# Patient Record
Sex: MALE | Race: WHITE | NOT HISPANIC OR LATINO | URBAN - METROPOLITAN AREA
[De-identification: names, ages, dates, MRNs, and addresses within clinical notes are randomized per-mention and may not be internally consistent; named-entity substitution may affect disease eponyms.]

---

## 2023-05-11 ENCOUNTER — OFFICE VISIT (OUTPATIENT)
Dept: INTERNAL MEDICINE CLINIC | Facility: CLINIC | Age: 43
End: 2023-05-11

## 2023-05-11 VITALS
BODY MASS INDEX: 23.1 KG/M2 | OXYGEN SATURATION: 97 % | WEIGHT: 180 LBS | HEART RATE: 80 BPM | RESPIRATION RATE: 12 BRPM | DIASTOLIC BLOOD PRESSURE: 70 MMHG | HEIGHT: 74 IN | TEMPERATURE: 98.3 F | SYSTOLIC BLOOD PRESSURE: 122 MMHG

## 2023-05-11 DIAGNOSIS — R73.9 HYPERGLYCEMIA: ICD-10-CM

## 2023-05-11 DIAGNOSIS — R10.11 RIGHT UPPER QUADRANT ABDOMINAL PAIN: ICD-10-CM

## 2023-05-11 DIAGNOSIS — R53.83 OTHER FATIGUE: ICD-10-CM

## 2023-05-11 DIAGNOSIS — Z13.6 SCREENING FOR CARDIOVASCULAR CONDITION: ICD-10-CM

## 2023-05-11 DIAGNOSIS — R55 NEAR SYNCOPE: ICD-10-CM

## 2023-05-11 DIAGNOSIS — Z11.59 NEED FOR HEPATITIS C SCREENING TEST: ICD-10-CM

## 2023-05-11 DIAGNOSIS — R10.84 GENERALIZED ABDOMINAL PAIN: ICD-10-CM

## 2023-05-11 DIAGNOSIS — Z00.00 ANNUAL PHYSICAL EXAM: Primary | ICD-10-CM

## 2023-05-11 DIAGNOSIS — I45.10 INCOMPLETE RBBB: ICD-10-CM

## 2023-05-11 DIAGNOSIS — Z80.0 FAMILY HX OF COLON CANCER: ICD-10-CM

## 2023-05-11 DIAGNOSIS — E87.1 HYPONATREMIA: ICD-10-CM

## 2023-05-11 DIAGNOSIS — Z13.1 SCREENING FOR DIABETES MELLITUS: ICD-10-CM

## 2023-05-11 NOTE — PROGRESS NOTES
NEW PATIENT  S/P [facility] ER 23 NEAR SYNCOPE AND RUQ PAIN   Cbc cmp-ok  ekg icrbbb    No immaging no dx      6 years at go candida  Gi tract  Lower intestine  No gi dr Lutz  Colon ancer 78  Dizzy  Lightheaded dizzy  No meds no diagnosis   Term baby  Unemployed   [address]  No environmental  Travel-not outside [facility] INTER[facility] MEDICINE    NAME: Jennifer Dowd  AGE: 36 y o  SEX: male  : 1980     DATE: 2023     Assessment and Plan:     Problem List Items Addressed This Visit        Cardiovascular and Mediastinum    Incomplete RBBB    Relevant Orders    Holter monitor    Echo complete w/ contrast if indicated    Near syncope    Relevant Orders    Holter monitor    Echo complete w/ contrast if indicated       Other    Screening for cardiovascular condition - Primary    Relevant Orders    Comprehensive metabolic panel    Lipid panel    Need for hepatitis C screening test    Relevant Orders    Hepatitis C antibody    Other fatigue    Relevant Orders    CBC and differential    TSH, 3rd generation with Free T4 reflex    Right upper quadrant abdominal pain    Hyperglycemia    Hyponatremia    Family hx of colon cancer    Relevant Orders    Ambulatory Referral to Gastroenterology    Generalized abdominal pain    Relevant Orders    Lipase    US abdomen complete    Ambulatory Referral to Gastroenterology       Immunizations and preventive care screenings were discussed with patient today  Appropriate education was printed on patient's after visit summary  Discussed risks and benefits of prostate cancer screening  We discussed the controversial history of PSA screening for prostate cancer in the [place] as well as the risk of over detection and over treatment of prostate cancer by way of PSA screening    The patient understands that PSA blood testing is an imperfect way to screen for prostate cancer and that elevated PSA levels in the blood may also be caused by infection, inflammation, prostatic trauma or manipulation, urological procedures, or by benign prostatic enlargement  The role of the digital rectal examination in prostate cancer screening was also discussed and I discussed with him that there is large interobserver variability in the findings of digital rectal examination  Counseling:  · Exercise: the importance of regular exercise/physical activity was discussed  Recommend exercise 3-5 times per week for at least 30 minutes  Return in about 4 weeks (around 6/8/2023), or if symptoms worsen or fail to improve  Chief Complaint:     No chief complaint on file  History of Present Illness:     Adult Annual Physical   Patient here for a comprehensive physical exam  The patient reports no problems  Diet and Physical Activity  · Diet/Nutrition: well balanced diet  · Exercise: no formal exercise  Depression Screening  PHQ-2/9 Depression Screening         General Health  · Sleep: sleeps well  · Hearing: normal - none   · Vision: wears glasses  · Dental: regular dental visits   Health  · Symptoms include: none     Review of Systems:     Review of Systems   Constitutional: Negative for activity change, appetite change, chills, diaphoresis, fatigue and fever  HENT: Negative for congestion, facial swelling, hearing loss, mouth sores, rhinorrhea, sore throat, trouble swallowing and voice change  Eyes: Negative for photophobia and pain  Respiratory: Negative for apnea, cough, chest tightness, shortness of breath and stridor  Cardiovascular: Negative for chest pain, palpitations and leg swelling  Gastrointestinal: Negative for abdominal distention, abdominal pain, blood in stool and constipation  Endocrine: Negative for cold intolerance and heat intolerance  Genitourinary: Negative for difficulty urinating, dysuria, flank pain, genital sores, hematuria and urgency     Musculoskeletal: "Negative for arthralgias, back pain, gait problem, joint swelling and myalgias  Skin: Negative for rash and wound  Allergic/Immunologic: Negative for environmental allergies, food allergies and immunocompromised state  Neurological: Negative for dizziness, tremors, seizures, syncope, facial asymmetry, speech difficulty, weakness, light-headedness, numbness and headaches  Hematological: Negative for adenopathy  Does not bruise/bleed easily  Psychiatric/Behavioral: Negative for agitation, behavioral problems, hallucinations, self-injury, sleep disturbance and suicidal ideas  Past Medical History:     History reviewed  No pertinent past medical history  Past Surgical History:     History reviewed  No pertinent surgical history  Family History:     History reviewed  No pertinent family history  Social History:     Social History     Socioeconomic History   • Marital status: Unknown     Spouse name: None   • Number of children: None   • Years of education: None   • Highest education level: None   Occupational History   • None   Tobacco Use   • Smoking status: Never   • Smokeless tobacco: Never   Substance and Sexual Activity   • Alcohol use: None   • Drug use: None   • Sexual activity: None   Other Topics Concern   • None   Social History Narrative   • None     Social Determinants of Health     Financial Resource Strain: Not on file   Food Insecurity: Not on file   Transportation Needs: Not on file   Physical Activity: Not on file   Stress: Not on file   Social Connections: Not on file   Intimate Partner Violence: Not on file   Housing Stability: Not on file      Current Medications:     No current outpatient medications on file  No current facility-administered medications for this visit        Allergies:     No Known Allergies   Physical Exam:     /70   Pulse 80   Temp 98 3 °F (36 8 °C)   Resp 12   Ht 6' 2\" (1 88 m)   Wt 81 6 kg (180 lb)   SpO2 97%   BMI 23 11 kg/m²     Physical " Exam  Constitutional:       General: He is not in acute distress  Appearance: Normal appearance  He is not ill-appearing or toxic-appearing  HENT:      Head: Normocephalic and atraumatic  Right Ear: Tympanic membrane and external ear normal       Left Ear: Tympanic membrane and external ear normal       Nose: Nose normal       Mouth/Throat:      Mouth: Mucous membranes are moist       Pharynx: Oropharynx is clear  Eyes:      General: No scleral icterus  Right eye: No discharge  Left eye: No discharge  Extraocular Movements: Extraocular movements intact  Conjunctiva/sclera: Conjunctivae normal       Pupils: Pupils are equal, round, and reactive to light  Neck:      Vascular: No carotid bruit  Cardiovascular:      Rate and Rhythm: Normal rate and regular rhythm  Pulses: Normal pulses  Heart sounds: Murmur heard  No friction rub  No gallop  Pulmonary:      Effort: Pulmonary effort is normal       Breath sounds: Normal breath sounds  No wheezing, rhonchi or rales  Abdominal:      General: Bowel sounds are normal  There is no distension  Palpations: Abdomen is soft  There is no mass  Tenderness: There is no guarding or rebound  Musculoskeletal:         General: No swelling  Cervical back: Normal range of motion and neck supple  No rigidity  Right lower leg: No edema  Left lower leg: No edema  Lymphadenopathy:      Cervical: No cervical adenopathy  Skin:     General: Skin is warm  Capillary Refill: Capillary refill takes less than 2 seconds  Coloration: Skin is not jaundiced  Findings: No rash  Neurological:      General: No focal deficit present  Mental Status: He is alert and oriented to person, place, and time  Cranial Nerves: No cranial nerve deficit  Sensory: No sensory deficit  Motor: No weakness        Gait: Gait normal       Deep Tendon Reflexes: Reflexes normal    Psychiatric: Mood and Affect: Mood normal          Behavior: Behavior normal          Judgment: Judgment normal           Yessica Guzman DO  [facility] INTERNAL MEDICINE

## 2023-05-23 ENCOUNTER — HOSPITAL ENCOUNTER (OUTPATIENT)
Dept: NON INVASIVE DIAGNOSTICS | Facility: HOSPITAL | Age: 43
Discharge: HOME | End: 2023-05-23
Attending: INTERNAL MEDICINE

## 2023-05-23 ENCOUNTER — HOSPITAL ENCOUNTER (OUTPATIENT)
Dept: ULTRASOUND IMAGING | Facility: HOSPITAL | Age: 43
Discharge: HOME | End: 2023-05-23
Attending: INTERNAL MEDICINE

## 2023-05-23 VITALS
HEIGHT: 74 IN | DIASTOLIC BLOOD PRESSURE: 70 MMHG | BODY MASS INDEX: 23.1 KG/M2 | WEIGHT: 180 LBS | HEART RATE: 70 BPM | SYSTOLIC BLOOD PRESSURE: 122 MMHG

## 2023-05-23 DIAGNOSIS — I45.10 INCOMPLETE RBBB: ICD-10-CM

## 2023-05-23 DIAGNOSIS — R10.84 GENERALIZED ABDOMINAL PAIN: ICD-10-CM

## 2023-05-23 DIAGNOSIS — R55 NEAR SYNCOPE: ICD-10-CM

## 2023-05-24 LAB
AORTIC ROOT: 3.6 CM
AORTIC VALVE MEAN VELOCITY: 9.7 M/S
APICAL FOUR CHAMBER EJECTION FRACTION: 65 %
AV LVOT MEAN GRADIENT: 3 MMHG
AV LVOT PEAK GRADIENT: 6 MMHG
AV MEAN GRADIENT: 4 MMHG
AV PEAK GRADIENT: 9 MMHG
AV VELOCITY RATIO: 0.8
DOP CALC AO PEAK VEL: 1.47 M/S
DOP CALC AO VTI: 29.39 CM
DOP CALC LVOT PEAK VEL VTI: 25.29 CM
DOP CALC LVOT PEAK VEL: 1.18 M/S
DOP CALC MV VTI: 27.85 CM
E WAVE DECELERATION TIME: 230 MS
FRACTIONAL SHORTENING: 30 % (ref 28–44)
GLOBAL LONGITUIDAL STRAIN: -22 %
INTERVENTRICULAR SEPTUM IN DIASTOLE (PARASTERNAL SHORT AXIS VIEW): 0.6 CM
INTERVENTRICULAR SEPTUM: 0.6 CM (ref 0.6–1.1)
IVC: 25 MM
LAAS-AP2: 16.4 CM2
LAAS-AP4: 15.3 CM2
LEFT ATRIUM SIZE: 3.3 CM
LEFT INTERNAL DIMENSION IN SYSTOLE: 3.5 CM (ref 2.1–4)
LEFT VENTRICLE DIASTOLIC VOLUME (MOD BIPLANE): 133 ML
LEFT VENTRICLE SYSTOLIC VOLUME (MOD BIPLANE): 45 ML
LEFT VENTRICULAR INTERNAL DIMENSION IN DIASTOLE: 5 CM (ref 3.5–6)
LEFT VENTRICULAR POSTERIOR WALL IN END DIASTOLE: 0.6 CM
LEFT VENTRICULAR STROKE VOLUME: 69 ML
LV EF: 66 %
LVSV (TEICH): 69 ML
MV E'TISSUE VEL-LAT: 20 CM/S
MV E'TISSUE VEL-SEP: 13 CM/S
MV MEAN GRADIENT: 1 MMHG
MV PEAK A VEL: 0.48 M/S
MV PEAK E VEL: 84 CM/S
MV PEAK GRADIENT: 4 MMHG
MV STENOSIS PRESSURE HALF TIME: 67 MS
MV VALVE AREA P 1/2 METHOD: 3.28 CM2
RA PRESSURE ESTIMATED: 8 MMHG
RIGHT ATRIAL 2D VOLUME: 32 ML
RIGHT ATRIUM AREA SYSTOLE A4C: 13.6 CM2
RIGHT VENTRICLE ID DIMENSION: 3.7 CM
SL CV LEFT ATRIUM LENGTH A2C: 4.7 CM
SL CV LV EF: 66
SL CV PED ECHO LEFT VENTRICLE DIASTOLIC VOLUME (MOD BIPLANE) 2D: 120 ML
SL CV PED ECHO LEFT VENTRICLE SYSTOLIC VOLUME (MOD BIPLANE) 2D: 51 ML
TRICUSPID ANNULAR PLANE SYSTOLIC EXCURSION: 2.7 CM

## 2023-06-01 ENCOUNTER — HOSPITAL ENCOUNTER (OUTPATIENT)
Dept: ULTRASOUND IMAGING | Facility: HOSPITAL | Age: 43
End: 2023-06-01
Attending: INTERNAL MEDICINE
Payer: COMMERCIAL

## 2023-06-01 PROCEDURE — 76700 US EXAM ABDOM COMPLETE: CPT

## 2023-06-05 ENCOUNTER — HOSPITAL ENCOUNTER (OUTPATIENT)
Dept: NON INVASIVE DIAGNOSTICS | Facility: HOSPITAL | Age: 43
Discharge: HOME | End: 2023-06-05
Attending: INTERNAL MEDICINE
Payer: COMMERCIAL

## 2023-06-05 ENCOUNTER — APPOINTMENT (OUTPATIENT)
Dept: LAB | Facility: HOSPITAL | Age: 43
End: 2023-06-05
Payer: COMMERCIAL

## 2023-06-05 DIAGNOSIS — R10.84 GENERALIZED ABDOMINAL PAIN: ICD-10-CM

## 2023-06-05 DIAGNOSIS — R53.83 OTHER FATIGUE: ICD-10-CM

## 2023-06-05 DIAGNOSIS — R55 NEAR SYNCOPE: ICD-10-CM

## 2023-06-05 DIAGNOSIS — Z11.59 NEED FOR HEPATITIS C SCREENING TEST: ICD-10-CM

## 2023-06-05 DIAGNOSIS — I45.10 INCOMPLETE RBBB: ICD-10-CM

## 2023-06-05 DIAGNOSIS — Z13.6 SCREENING FOR CARDIOVASCULAR CONDITION: ICD-10-CM

## 2023-06-05 LAB
ALBUMIN SERPL BCP-MCNC: 4.6 G/DL (ref 3.5–5)
ALP SERPL-CCNC: 27 U/L (ref 34–104)
ALT SERPL W P-5'-P-CCNC: 14 U/L (ref 7–52)
ANION GAP SERPL CALCULATED.3IONS-SCNC: 5 MMOL/L (ref 4–13)
AST SERPL W P-5'-P-CCNC: 12 U/L (ref 13–39)
BASOPHILS # BLD AUTO: 0.05 THOUSANDS/ÂΜL (ref 0–0.1)
BASOPHILS NFR BLD AUTO: 1 % (ref 0–1)
BILIRUB SERPL-MCNC: 1.31 MG/DL (ref 0.2–1)
BUN SERPL-MCNC: 13 MG/DL (ref 5–25)
CALCIUM SERPL-MCNC: 9.9 MG/DL (ref 8.4–10.2)
CHLORIDE SERPL-SCNC: 102 MMOL/L (ref 96–108)
CHOLEST SERPL-MCNC: 262 MG/DL
CO2 SERPL-SCNC: 31 MMOL/L (ref 21–32)
CREAT SERPL-MCNC: 0.87 MG/DL (ref 0.6–1.3)
EOSINOPHIL # BLD AUTO: 0.1 THOUSAND/ÂΜL (ref 0–0.61)
EOSINOPHIL NFR BLD AUTO: 2 % (ref 0–6)
ERYTHROCYTE [DISTWIDTH] IN BLOOD BY AUTOMATED COUNT: 12 % (ref 11.6–15.1)
GFR SERPL CREATININE-BSD FRML MDRD: 105 ML/MIN/1.73SQ M
GLUCOSE P FAST SERPL-MCNC: 100 MG/DL (ref 65–99)
HCT VFR BLD AUTO: 37.8 % (ref 36.5–49.3)
HCV AB SER QL: NORMAL
HDLC SERPL-MCNC: 70 MG/DL
HGB BLD-MCNC: 12.6 G/DL (ref 12–17)
IMM GRANULOCYTES # BLD AUTO: 0.01 THOUSAND/UL (ref 0–0.2)
IMM GRANULOCYTES NFR BLD AUTO: 0 % (ref 0–2)
LDLC SERPL CALC-MCNC: 180 MG/DL (ref 0–100)
LIPASE SERPL-CCNC: 16 U/L (ref 11–82)
LYMPHOCYTES # BLD AUTO: 1.83 THOUSANDS/ÂΜL (ref 0.6–4.47)
LYMPHOCYTES NFR BLD AUTO: 40 % (ref 14–44)
MCH RBC QN AUTO: 29.4 PG (ref 26.8–34.3)
MCHC RBC AUTO-ENTMCNC: 33.3 G/DL (ref 31.4–37.4)
MCV RBC AUTO: 88 FL (ref 82–98)
MONOCYTES # BLD AUTO: 0.48 THOUSAND/ÂΜL (ref 0.17–1.22)
MONOCYTES NFR BLD AUTO: 11 % (ref 4–12)
NEUTROPHILS # BLD AUTO: 2.08 THOUSANDS/ÂΜL (ref 1.85–7.62)
NEUTS SEG NFR BLD AUTO: 46 % (ref 43–75)
NONHDLC SERPL-MCNC: 192 MG/DL
NRBC BLD AUTO-RTO: 0 /100 WBCS
PLATELET # BLD AUTO: 150 THOUSANDS/UL (ref 149–390)
PMV BLD AUTO: 11.1 FL (ref 8.9–12.7)
POTASSIUM SERPL-SCNC: 4.1 MMOL/L (ref 3.5–5.3)
PROT SERPL-MCNC: 7.7 G/DL (ref 6.4–8.4)
RBC # BLD AUTO: 4.29 MILLION/UL (ref 3.88–5.62)
SODIUM SERPL-SCNC: 138 MMOL/L (ref 135–147)
TRIGL SERPL-MCNC: 59 MG/DL
TSH SERPL DL<=0.05 MIU/L-ACNC: 1.42 UIU/ML (ref 0.45–4.5)
WBC # BLD AUTO: 4.55 THOUSAND/UL (ref 4.31–10.16)

## 2023-06-05 PROCEDURE — 83690 ASSAY OF LIPASE: CPT

## 2023-06-05 PROCEDURE — 80061 LIPID PANEL: CPT

## 2023-06-05 PROCEDURE — 93225 XTRNL ECG REC<48 HRS REC: CPT

## 2023-06-05 PROCEDURE — 36415 COLL VENOUS BLD VENIPUNCTURE: CPT

## 2023-06-05 PROCEDURE — 84443 ASSAY THYROID STIM HORMONE: CPT

## 2023-06-05 PROCEDURE — 85025 COMPLETE CBC W/AUTO DIFF WBC: CPT

## 2023-06-05 PROCEDURE — 86803 HEPATITIS C AB TEST: CPT

## 2023-06-05 PROCEDURE — 93226 XTRNL ECG REC<48 HR SCAN A/R: CPT

## 2023-06-05 PROCEDURE — 80053 COMPREHEN METABOLIC PANEL: CPT

## 2023-06-06 ENCOUNTER — APPOINTMENT (EMERGENCY)
Dept: RADIOLOGY | Facility: HOSPITAL | Age: 43
End: 2023-06-06
Payer: COMMERCIAL

## 2023-06-06 ENCOUNTER — HOSPITAL ENCOUNTER (EMERGENCY)
Facility: HOSPITAL | Age: 43
Discharge: HOME | End: 2023-06-06
Attending: EMERGENCY MEDICINE
Payer: COMMERCIAL

## 2023-06-06 VITALS
RESPIRATION RATE: 16 BRPM | DIASTOLIC BLOOD PRESSURE: 74 MMHG | HEART RATE: 65 BPM | TEMPERATURE: 99.4 F | WEIGHT: 180 LBS | SYSTOLIC BLOOD PRESSURE: 130 MMHG | OXYGEN SATURATION: 100 % | HEIGHT: 74 IN | BODY MASS INDEX: 23.1 KG/M2

## 2023-06-06 DIAGNOSIS — R07.89 ATYPICAL CHEST PAIN: Primary | ICD-10-CM

## 2023-06-06 LAB
ALBUMIN SERPL BCP-MCNC: 4.3 G/DL (ref 3.5–5)
ALP SERPL-CCNC: 26 U/L (ref 34–104)
ALT SERPL W P-5'-P-CCNC: 13 U/L (ref 7–52)
ANION GAP SERPL CALCULATED.3IONS-SCNC: 6 MMOL/L (ref 4–13)
APTT PPP: 27 SECONDS (ref 23–37)
AST SERPL W P-5'-P-CCNC: 11 U/L (ref 13–39)
BASOPHILS # BLD AUTO: 0.05 THOUSANDS/ÂΜL (ref 0–0.1)
BASOPHILS NFR BLD AUTO: 1 % (ref 0–1)
BILIRUB SERPL-MCNC: 0.93 MG/DL (ref 0.2–1)
BNP SERPL-MCNC: 33 PG/ML (ref 0–100)
BUN SERPL-MCNC: 13 MG/DL (ref 5–25)
CALCIUM SERPL-MCNC: 9.3 MG/DL (ref 8.4–10.2)
CARDIAC TROPONIN I PNL SERPL HS: <2 NG/L
CHLORIDE SERPL-SCNC: 104 MMOL/L (ref 96–108)
CO2 SERPL-SCNC: 28 MMOL/L (ref 21–32)
CREAT SERPL-MCNC: 0.86 MG/DL (ref 0.6–1.3)
EOSINOPHIL # BLD AUTO: 0.07 THOUSAND/ÂΜL (ref 0–0.61)
EOSINOPHIL NFR BLD AUTO: 1 % (ref 0–6)
ERYTHROCYTE [DISTWIDTH] IN BLOOD BY AUTOMATED COUNT: 11.9 % (ref 11.6–15.1)
GFR SERPL CREATININE-BSD FRML MDRD: 106 ML/MIN/1.73SQ M
GLUCOSE SERPL-MCNC: 102 MG/DL (ref 65–140)
HCT VFR BLD AUTO: 36.1 % (ref 36.5–49.3)
HGB BLD-MCNC: 12.1 G/DL (ref 12–17)
IMM GRANULOCYTES # BLD AUTO: 0.01 THOUSAND/UL (ref 0–0.2)
IMM GRANULOCYTES NFR BLD AUTO: 0 % (ref 0–2)
INR PPP: 1.02 (ref 0.84–1.19)
LYMPHOCYTES # BLD AUTO: 1.39 THOUSANDS/ÂΜL (ref 0.6–4.47)
LYMPHOCYTES NFR BLD AUTO: 24 % (ref 14–44)
MCH RBC QN AUTO: 29.6 PG (ref 26.8–34.3)
MCHC RBC AUTO-ENTMCNC: 33.5 G/DL (ref 31.4–37.4)
MCV RBC AUTO: 88 FL (ref 82–98)
MONOCYTES # BLD AUTO: 0.53 THOUSAND/ÂΜL (ref 0.17–1.22)
MONOCYTES NFR BLD AUTO: 9 % (ref 4–12)
NEUTROPHILS # BLD AUTO: 3.87 THOUSANDS/ÂΜL (ref 1.85–7.62)
NEUTS SEG NFR BLD AUTO: 65 % (ref 43–75)
NRBC BLD AUTO-RTO: 0 /100 WBCS
PLATELET # BLD AUTO: 145 THOUSANDS/UL (ref 149–390)
PMV BLD AUTO: 11.1 FL (ref 8.9–12.7)
POTASSIUM SERPL-SCNC: 4 MMOL/L (ref 3.5–5.3)
PROT SERPL-MCNC: 7.2 G/DL (ref 6.4–8.4)
PROTHROMBIN TIME: 14.1 SECONDS (ref 11.6–14.5)
RBC # BLD AUTO: 4.09 MILLION/UL (ref 3.88–5.62)
SODIUM SERPL-SCNC: 138 MMOL/L (ref 135–147)
TSH SERPL DL<=0.05 MIU/L-ACNC: 1.08 UIU/ML (ref 0.45–4.5)
WBC # BLD AUTO: 5.92 THOUSAND/UL (ref 4.31–10.16)

## 2023-06-06 PROCEDURE — 85025 COMPLETE CBC W/AUTO DIFF WBC: CPT | Performed by: EMERGENCY MEDICINE

## 2023-06-06 PROCEDURE — 36415 COLL VENOUS BLD VENIPUNCTURE: CPT | Performed by: EMERGENCY MEDICINE

## 2023-06-06 PROCEDURE — 84484 ASSAY OF TROPONIN QUANT: CPT | Performed by: EMERGENCY MEDICINE

## 2023-06-06 PROCEDURE — 85730 THROMBOPLASTIN TIME PARTIAL: CPT | Performed by: EMERGENCY MEDICINE

## 2023-06-06 PROCEDURE — 93005 ELECTROCARDIOGRAM TRACING: CPT

## 2023-06-06 PROCEDURE — 80053 COMPREHEN METABOLIC PANEL: CPT | Performed by: EMERGENCY MEDICINE

## 2023-06-06 PROCEDURE — 85610 PROTHROMBIN TIME: CPT | Performed by: EMERGENCY MEDICINE

## 2023-06-06 PROCEDURE — 83880 ASSAY OF NATRIURETIC PEPTIDE: CPT | Performed by: EMERGENCY MEDICINE

## 2023-06-06 PROCEDURE — 71045 X-RAY EXAM CHEST 1 VIEW: CPT

## 2023-06-06 PROCEDURE — 84443 ASSAY THYROID STIM HORMONE: CPT | Performed by: EMERGENCY MEDICINE

## 2023-06-06 RX ORDER — ACETAMINOPHEN 325 MG/1
650 TABLET ORAL ONCE
Status: COMPLETED | OUTPATIENT
Start: 2023-06-06 | End: 2023-06-06

## 2023-06-06 RX ORDER — KETOROLAC TROMETHAMINE 30 MG/ML
15 INJECTION, SOLUTION INTRAMUSCULAR; INTRAVENOUS ONCE
Status: DISCONTINUED | OUTPATIENT
Start: 2023-06-06 | End: 2023-06-06 | Stop reason: HOSPADM

## 2023-06-06 RX ADMIN — ACETAMINOPHEN 650 MG: 325 TABLET, FILM COATED ORAL at 18:03

## 2023-06-06 NOTE — ED PROVIDER NOTES
History  Chief Complaint   Patient presents with   • Chest Pain     Patient presents to ED with chest pain/lightheaded sensation that began one hour ago  This is a 44-year-old who presents with forehead tingling and lightheadedness in addition to a vague substernal chest discomfort that started 1 hour ago while at rest   He has a non-smoker does have a history of hypercholesterolemia  No prior history of DVT or PE  Recently seen by primary care physician who noticed a heart murmur and ordered a cardiac monitor which is currently in place      History provided by:  Patient  Medical Problem  Location:  Substernal  Quality:  Vague discomfort  Severity:  Mild  Onset quality:  Sudden  Duration:  1 hour  Timing:  Constant  Progression:  Waxing and waning  Chronicity:  New  Context:  Chest discomfort while at rest  Associated symptoms: chest pain        None       History reviewed  No pertinent past medical history  History reviewed  No pertinent surgical history  History reviewed  No pertinent family history  I have reviewed and agree with the history as documented  E-Cigarette/Vaping     E-Cigarette/Vaping Substances     Social History     Tobacco Use   • Smoking status: Never   • Smokeless tobacco: Never       Review of Systems   Cardiovascular: Positive for chest pain  Neurological: Positive for light-headedness  All other systems reviewed and are negative  Physical Exam  Physical Exam  Vitals and nursing note reviewed  Constitutional:       General: He is not in acute distress  Appearance: He is not ill-appearing, toxic-appearing or diaphoretic  HENT:      Head: Normocephalic and atraumatic  Right Ear: External ear normal       Left Ear: External ear normal    Eyes:      General: No scleral icterus  Right eye: No discharge  Left eye: No discharge  Extraocular Movements: Extraocular movements intact  Pupils: Pupils are equal, round, and reactive to light  Cardiovascular:      Rate and Rhythm: Normal rate and regular rhythm  Pulses: Normal pulses  Heart sounds: Murmur heard  No friction rub  No gallop  Pulmonary:      Effort: Pulmonary effort is normal  No respiratory distress  Breath sounds: No stridor  No wheezing, rhonchi or rales  Abdominal:      General: There is no distension  Palpations: Abdomen is soft  Tenderness: There is no abdominal tenderness  There is no guarding or rebound  Musculoskeletal:         General: No swelling, tenderness, deformity or signs of injury  Normal range of motion  Cervical back: Normal range of motion and neck supple  No rigidity or tenderness  Right lower leg: No edema  Left lower leg: No edema  Skin:     General: Skin is warm  Coloration: Skin is not jaundiced or pale  Findings: No bruising, erythema or rash  Neurological:      General: No focal deficit present  Mental Status: He is alert and oriented to person, place, and time  Cranial Nerves: No cranial nerve deficit  Sensory: No sensory deficit  Motor: No weakness  Coordination: Coordination normal       Gait: Gait normal    Psychiatric:         Mood and Affect: Mood normal          Behavior: Behavior normal          Thought Content:  Thought content normal          Vital Signs  ED Triage Vitals   Temperature Pulse Respirations Blood Pressure SpO2   06/06/23 1703 06/06/23 1703 06/06/23 1703 06/06/23 1703 06/06/23 1703   99 4 °F (37 4 °C) 73 18 127/80 98 %      Temp src Heart Rate Source Patient Position - Orthostatic VS BP Location FiO2 (%)   -- 06/06/23 1703 06/06/23 1703 06/06/23 1703 --    Monitor Sitting Right arm       Pain Score       06/06/23 1803       2           Vitals:    06/06/23 1703 06/06/23 1805   BP: 127/80 130/74   Pulse: 73 65   Patient Position - Orthostatic VS: Sitting Sitting         Visual Acuity      ED Medications  Medications   ketorolac (TORADOL) injection 15 mg (15 mg Intravenous Not Given 6/6/23 1803)   acetaminophen (TYLENOL) tablet 650 mg (650 mg Oral Given 6/6/23 1803)       Diagnostic Studies  Results Reviewed     Procedure Component Value Units Date/Time    TSH [[id number]]  (Normal) Collected: 06/06/23 1735    Lab Status: Final result Specimen: Blood from Arm, Left Updated: 06/06/23 1815     TSH 3RD GENERATON 1 082 uIU/mL     HS Troponin 0hr (reflex protocol) [[id number]]  (Normal) Collected: 06/06/23 1735    Lab Status: Final result Specimen: Blood from Arm, Left Updated: 06/06/23 1806     hs TnI 0hr <2 ng/L     HS Troponin I 2hr [[id number]]     Lab Status: No result Specimen: Blood     B-Type Natriuretic Peptide(BNP) [[id number]]  (Normal) Collected: 06/06/23 1735    Lab Status: Final result Specimen: Blood from Arm, Left Updated: 06/06/23 1805     BNP 33 pg/mL     CBC and differential [[id number]]  (Abnormal) Collected: 06/06/23 1735    Lab Status: Final result Specimen: Blood from Arm, Left Updated: 06/06/23 1802     WBC 5 92 Thousand/uL      RBC 4 09 Million/uL      Hemoglobin 12 1 g/dL      Hematocrit 36 1 %      MCV 88 fL      MCH 29 6 pg      MCHC 33 5 g/dL      RDW 11 9 %      MPV 11 1 fL      Platelets 035 Thousands/uL      nRBC 0 /100 WBCs      Neutrophils Relative 65 %      Immat GRANS % 0 %      Lymphocytes Relative 24 %      Monocytes Relative 9 %      Eosinophils Relative 1 %      Basophils Relative 1 %      Neutrophils Absolute 3 87 Thousands/µL      Immature Grans Absolute 0 01 Thousand/uL      Lymphocytes Absolute 1 39 Thousands/µL      Monocytes Absolute 0 53 Thousand/µL      Eosinophils Absolute 0 07 Thousand/µL      Basophils Absolute 0 05 Thousands/µL     Comprehensive metabolic panel [[id number]]  (Abnormal) Collected: 06/06/23 1735    Lab Status: Final result Specimen: Blood from Arm, Left Updated: 06/06/23 1758     Sodium 138 mmol/L      Potassium 4 0 mmol/L      Chloride 104 mmol/L      CO2 28 mmol/L      ANION GAP 6 mmol/L      BUN 13 mg/dL Creatinine 0 86 mg/dL      Glucose 102 mg/dL      Calcium 9 3 mg/dL      AST 11 U/L      ALT 13 U/L      Alkaline Phosphatase 26 U/L      Total Protein 7 2 g/dL      Albumin 4 3 g/dL      Total Bilirubin 0 93 mg/dL      eGFR 106 ml/min/1 73sq m     Narrative:      Jac guidelines for Chronic Kidney Disease (CKD):   •  Stage 1 with normal or high GFR (GFR > 90 mL/min/1 73 square meters)  •  Stage 2 Mild CKD (GFR = 60-89 mL/min/1 73 square meters)  •  Stage 3A Moderate CKD (GFR = 45-59 mL/min/1 73 square meters)  •  Stage 3B Moderate CKD (GFR = 30-44 mL/min/1 73 square meters)  •  Stage 4 Severe CKD (GFR = 15-29 mL/min/1 73 square meters)  •  Stage 5 End Stage CKD (GFR <15 mL/min/1 73 square meters)  Note: GFR calculation is accurate only with a steady state creatinine    Protime-INR [[id number]]  (Normal) Collected: 06/06/23 1735    Lab Status: Final result Specimen: Blood from Arm, Left Updated: 06/06/23 1755     Protime 14 1 seconds      INR 1 02    APTT [[id number]]  (Normal) Collected: 06/06/23 1735    Lab Status: Final result Specimen: Blood from Arm, Left Updated: 06/06/23 1755     PTT 27 seconds                  XR chest 1 view portable   ED Interpretation by Abi Delgado DO (06/06 1915)   No acute infiltrate or pneumothorax                 Procedures  ECG 12 Lead Documentation Only    Date/Time: 6/6/2023 5:26 PM    Performed by: Abi Delgado DO  Authorized by: Abi Delgado DO    ECG reviewed by me, the ED Provider: yes    Patient location:  ED  Rate:     ECG rate:  71  Rhythm:     Rhythm: sinus rhythm    Conduction:     Conduction: abnormal      Abnormal conduction: non-specific intraventricular conduction delay    T waves:     T waves: normal               ED Course  ED Course as of 06/06/23 1923 Tue Jun 06, 2023 1754 Repeat EKG is sinus without any acute injury similar to previous EKG patient started to have some more chest discomfort             HEART Risk Score    Flowsheet Row Most Recent Value   Heart Score Risk Calculator    History 0 Filed at: 06/06/2023 1915   ECG 0 Filed at: 06/06/2023 1915   Age 0 Filed at: 06/06/2023 1915   Risk Factors 1 Filed at: 06/06/2023 1915   Troponin 0 Filed at: 06/06/2023 1915   HEART Score 1 Filed at: 06/06/2023 1915           Stroke Assessment     Row Name 06/06/23 1737             NIH Stroke Scale    Interval Baseline      Level of Consciousness (1a ) 0      LOC Questions (1b ) 0      LOC Commands (1c ) 0      Best Gaze (2 ) 0      Visual (3 ) 0      Facial Palsy (4 ) 0      Motor Arm, Left (5a ) 0      Motor Arm, Right (5b ) 0      Motor Leg, Left (6a ) 0      Motor Leg, Right (6b ) 0      Limb Ataxia (7 ) 0      Sensory (8 ) 0      Best Language (9 ) 0      Dysarthria (10 ) 0      Extinction and Inattention (11 ) (Formerly Neglect) 0      Total 0                    PERC Rule for PE    Flowsheet Row Most Recent Value   PERC Rule for PE    Age >=50 0 Filed at: 06/06/2023 1801   HR >=100 0 Filed at: 06/06/2023 1801   O2 Sat on room air < 95% 0 Filed at: 06/06/2023 1801   History of PE or DVT 0 Filed at: 06/06/2023 1801   Recent trauma or surgery 0 Filed at: 06/06/2023 1801   Hemoptysis 0 Filed at: 06/06/2023 1801   Exogenous estrogen 0 Filed at: 06/06/2023 1801   Unilateral leg swelling 0 Filed at: 06/06/2023 1801   PERC Rule for PE Results 0 Filed at: 06/06/2023 1801              SBIRT 22yo+    Flowsheet Row Most Recent Value   Initial Alcohol Screen: US AUDIT-C     1  How often do you have a drink containing alcohol? 0 Filed at: 06/06/2023 1704   2  How many drinks containing alcohol do you have on a typical day you are drinking? 0 Filed at: 06/06/2023 1704   3a  Male UNDER 65: How often do you have five or more drinks on one occasion? 0 Filed at: 06/06/2023 1704   Audit-C Score 0 Filed at: 06/06/2023 1611   CINDY: How many times in the past year have you        Used an illegal drug or used a prescription medication for non-medical reasons? Never Filed at: 06/06/2023 1704          Wells' Criteria for PE    Flowsheet Row Most Recent Value   Wells' Criteria for PE    Clinical signs and symptoms of DVT 0 Filed at: 06/06/2023 1800   PE is primary diagnosis or equally likely 0 Filed at: 06/06/2023 1800   HR >100 0 Filed at: 06/06/2023 1800   Immobilization at least 3 days or Surgery in the previous 4 weeks 0 Filed at: 06/06/2023 1800   Previous, objectively diagnosed PE or DVT 0 Filed at: 06/06/2023 1800   Hemoptysis 0 Filed at: 06/06/2023 1800   Malignancy with treatment within 6 months or palliative 0 Filed at: 06/06/2023 1800   Wells' Criteria Total 0 Filed at: 06/06/2023 1800                Medical Decision Making  Atypical chest pain rule out acute coronary syndrome versus esophagitis pneumothorax musculoskeletal pain work-up in progress including EKG chest x-ray and lab work    Amount and/or Complexity of Data Reviewed  Labs: ordered  Radiology: ordered  Disposition  Final diagnoses:   Atypical chest pain     Time reflects when diagnosis was documented in both MDM as applicable and the Disposition within this note     Time User Action Codes Description Comment    6/6/2023  5:38 PM Albina Prader Add [R07 89] Atypical chest pain       ED Disposition     ED Disposition   Discharge    Condition   Stable    Date/Time   Tue Jun 6, 2023  7:22 PM    Comment   Yuval Lu discharge to home/self care  Follow-up Information     Follow up With Specialties Details Why [address], DO Internal Medicine In 1 week  [address] [facility] [address] [facility]            Patient's Medications    No medications on file       No discharge procedures on file      PDMP Review       Value Time User    PDMP Reviewed  Yes 5/11/2023  8:16 AM Lupillo Leroy DO          ED Provider  Electronically Signed by           Abi Delgado DO  06/06/23 1926

## 2023-06-09 LAB
ATRIAL RATE: 72 BPM
P AXIS: 72 DEGREES
PR INTERVAL: 194 MS
QRS AXIS: 45 DEGREES
QRSD INTERVAL: 118 MS
QT INTERVAL: 400 MS
QTC INTERVAL: 438 MS
T WAVE AXIS: 65 DEGREES
VENTRICULAR RATE: 72 BPM

## 2023-06-09 PROCEDURE — 93010 ELECTROCARDIOGRAM REPORT: CPT | Performed by: INTERNAL MEDICINE

## 2023-06-15 ENCOUNTER — OFFICE VISIT (OUTPATIENT)
Dept: INTERNAL MEDICINE CLINIC | Facility: CLINIC | Age: 43
End: 2023-06-15
Payer: COMMERCIAL

## 2023-06-15 VITALS
OXYGEN SATURATION: 98 % | RESPIRATION RATE: 12 BRPM | HEIGHT: 74 IN | TEMPERATURE: 97.5 F | DIASTOLIC BLOOD PRESSURE: 70 MMHG | HEART RATE: 74 BPM | SYSTOLIC BLOOD PRESSURE: 120 MMHG | BODY MASS INDEX: 22.84 KG/M2 | WEIGHT: 178 LBS

## 2023-06-15 DIAGNOSIS — E87.1 HYPONATREMIA: ICD-10-CM

## 2023-06-15 DIAGNOSIS — R17 SERUM TOTAL BILIRUBIN ELEVATED: ICD-10-CM

## 2023-06-15 DIAGNOSIS — R10.84 GENERALIZED ABDOMINAL PAIN: ICD-10-CM

## 2023-06-15 DIAGNOSIS — R55 NEAR SYNCOPE: Primary | ICD-10-CM

## 2023-06-15 DIAGNOSIS — I45.10 INCOMPLETE RBBB: ICD-10-CM

## 2023-06-15 DIAGNOSIS — R07.89 OTHER CHEST PAIN: ICD-10-CM

## 2023-06-15 DIAGNOSIS — R73.9 HYPERGLYCEMIA: ICD-10-CM

## 2023-06-15 PROCEDURE — 93227 XTRNL ECG REC<48 HR R&I: CPT | Performed by: INTERNAL MEDICINE

## 2023-06-15 PROCEDURE — 99214 OFFICE O/P EST MOD 30 MIN: CPT | Performed by: INTERNAL MEDICINE

## 2023-06-15 RX ORDER — ASPIRIN 81 MG/1
81 TABLET ORAL DAILY
Qty: 30 TABLET | Refills: 0 | Status: SHIPPED | OUTPATIENT
Start: 2023-06-15 | End: 2023-07-15

## 2023-06-15 NOTE — PROGRESS NOTES
Assessment/Plan:    No problem-specific Assessment & Plan notes found for this encounter  Diagnoses and all orders for this visit:    Incomplete RBBB    Hyperglycemia    Hyponatremia          Subjective:      Patient ID: Antwan Marvin is a 36 y o  male  F/u  5/11/23 visit:  - labs==chol ldl 180   ==tbili  1 3== [facility] cardiology and gi follow up  -echo-5/23/23==nl ef  -us abd 6/1/2023=wnl  -holter-done 6/1/23 awaiting report=requested==ok    Er 6/6/23-CP and lightheaded= f/u family dr  -fuzzy head dull spread out pain      TODAY 6/15/23:  -cp=  rec cardiology est  And holter report  With high ldl, recurrent near syncope, icrbbb ,  -abd pain better      The following portions of the patient's history were reviewed and updated as appropriate: allergies, current medications, past family history, past medical history, past social history, past surgical history, and problem list     Review of Systems   Constitutional: Negative for activity change and fatigue  HENT: Negative for ear discharge, ear pain, rhinorrhea and sore throat  Eyes: Negative for pain and visual disturbance  Respiratory: Negative for cough and shortness of breath  Cardiovascular: Negative for chest pain and leg swelling  Gastrointestinal: Negative for abdominal pain, constipation and diarrhea  Endocrine: Negative for cold intolerance and polyuria  Genitourinary: Negative for flank pain and hematuria  Musculoskeletal: Negative for back pain and joint swelling  Skin: Negative for pallor and wound  Neurological: Negative for dizziness, seizures and speech difficulty  Psychiatric/Behavioral: Negative for confusion and hallucinations  Objective: There were no vitals taken for this visit  Physical Exam  Vitals and nursing note reviewed  Constitutional:       General: He is not in acute distress  Appearance: Normal appearance  He is not ill-appearing  HENT:      Head: Normocephalic        Right Ear: External ear normal  There is no impacted cerumen  Left Ear: External ear normal  There is no impacted cerumen  Nose: No congestion or rhinorrhea  Mouth/Throat:      Pharynx: No posterior oropharyngeal erythema  Eyes:      General: No scleral icterus  Right eye: No discharge  Left eye: No discharge  Neck:      Vascular: No carotid bruit  Cardiovascular:      Rate and Rhythm: Normal rate and regular rhythm  Heart sounds: Normal heart sounds  No murmur heard  No friction rub  No gallop  Pulmonary:      Breath sounds: No wheezing or rhonchi  Abdominal:      General: There is no distension  Tenderness: There is no abdominal tenderness  There is no guarding  Musculoskeletal:         General: No swelling  Cervical back: No rigidity  Right lower leg: No edema  Left lower leg: No edema  Lymphadenopathy:      Cervical: No cervical adenopathy  Skin:     Coloration: Skin is not jaundiced  Neurological:      Mental Status: He is alert  Cranial Nerves: No cranial nerve deficit  Motor: No weakness        Coordination: Coordination normal    Psychiatric:         Mood and Affect: Mood normal

## 2023-06-30 ENCOUNTER — TELEPHONE (OUTPATIENT)
Age: 43
End: 2023-06-30

## 2023-06-30 NOTE — TELEPHONE ENCOUNTER
Patients GI provider:  Dr Mohit Villalobos    Number to return call: [phone]    Reason for call: Pt says he is returning a call received today about his upcoming appt on 08 02 23, VM was left but he could not understand the message and needs clarification  No notes on account    Please call Pt at above number when possible     Scheduled procedure/appointment date if applicable: 02 09 39

## 2023-07-03 NOTE — TELEPHONE ENCOUNTER
Left a voice mail for the patient that I asked around and nobody knew any reasons for giving the patient a call and saw he is scheduled with [address] [place] [address] on 08/03 so might have been them calling him instead.

## 2023-07-10 PROBLEM — Z13.6 SCREENING FOR CARDIOVASCULAR CONDITION: Status: RESOLVED | Noted: 2023-05-11 | Resolved: 2023-07-10

## 2023-07-10 PROBLEM — Z11.59 NEED FOR HEPATITIS C SCREENING TEST: Status: RESOLVED | Noted: 2023-05-11 | Resolved: 2023-07-10

## 2023-08-03 ENCOUNTER — CONSULT (OUTPATIENT)
Dept: CARDIOLOGY CLINIC | Facility: CLINIC | Age: 43
End: 2023-08-03
Payer: COMMERCIAL

## 2023-08-03 VITALS
DIASTOLIC BLOOD PRESSURE: 64 MMHG | SYSTOLIC BLOOD PRESSURE: 118 MMHG | HEART RATE: 64 BPM | BODY MASS INDEX: 22.85 KG/M2 | HEIGHT: 74 IN

## 2023-08-03 DIAGNOSIS — R07.89 OTHER CHEST PAIN: Primary | ICD-10-CM

## 2023-08-03 DIAGNOSIS — R55 NEAR SYNCOPE: ICD-10-CM

## 2023-08-03 DIAGNOSIS — E78.00 HYPERCHOLESTEROLEMIA: ICD-10-CM

## 2023-08-03 DIAGNOSIS — I45.10 INCOMPLETE RBBB: ICD-10-CM

## 2023-08-03 PROCEDURE — 99243 OFF/OP CNSLTJ NEW/EST LOW 30: CPT | Performed by: INTERNAL MEDICINE

## 2023-08-03 NOTE — PROGRESS NOTES
Cardiology Consultation     Estefania Thompson  [id number]  1980  [facility] Mao Mathur  [facility].S. [address] [place] [id number] [phone]    1. Other chest pain  Ambulatory Referral to Cardiology      2. Incomplete RBBB  Ambulatory Referral to Cardiology      3. Near syncope  Ambulatory Referral to Cardiology      4. Hypercholesterolemia            Discussion/Summary: It is unclear at this point what the etiology to Rachana's symptoms are. Perhaps this could have been transient low blood pressure and with salt supplementation and has improved. However his blood pressure has seemingly run normal.  His chest pain is atypical and noncardiac. He even states that with taking potassium and salt supplementation the symptoms have all gone away. His Holter and echocardiogram were normal.  His ECG is a normal variant with an incomplete right bundle branch block. Even during his Holter he had lightheadedness that did not correspond to any findings. No other testing is needed at this time. He is symptom-free now. He will continue to follow with his PCP. His blood work was also unremarkable with the exception of an elevated LDL at 180. He does not meet strict criteria but he is close. If this value remains at this or higher I would suggest pharmacologic therapy in the near future. He only needs to see us back if needed. HPI:    Mr. Sravan Subramanian comes in for consultation to discuss symptoms of chest pain, lightheadedness and near syncope along with his cardiovascular testing that he had performed in May. A couple months ago Tomasa Hughes went and saw his PCP for the symptoms. He described random dull chest aching sensation which is not exertional.  He also was experiencing lightheadedness and feeling like he was going to pass out. He attributed this to low salt and potassium intake.   When he started supplementing both of these electrolytes his symptoms all went away. He last had symptoms around a month or so ago. He states to me that if he sticks to his potassium and salt supplementation he is asymptomatic. His PCP got an ECG which showed sinus rhythm and an incomplete right bundle branch block. He then had a Holter monitor and echocardiogram.  Both of these were unremarkable and normal.  He had a full set of blood work which was also normal with the exception of his cholesterol. His LDL is 180. Patient Active Problem List   Diagnosis   • Other fatigue   • Incomplete RBBB   • Right upper quadrant abdominal pain   • Hyperglycemia   • Near syncope   • Hyponatremia   • Family hx of colon cancer   • Generalized abdominal pain   • Serum total bilirubin elevated   • Hypercholesterolemia     History reviewed. No pertinent past medical history. Social History     Socioeconomic History   • Marital status: Unknown     Spouse name: Not on file   • Number of children: Not on file   • Years of education: Not on file   • Highest education level: Not on file   Occupational History   • Not on file   Tobacco Use   • Smoking status: Never   • Smokeless tobacco: Never   Substance and Sexual Activity   • Alcohol use: Not on file   • Drug use: Not on file   • Sexual activity: Not on file   Other Topics Concern   • Not on file   Social History Narrative   • Not on file     Social Determinants of Health     Financial Resource Strain: Not on file   Food Insecurity: Not on file   Transportation Needs: Not on file   Physical Activity: Not on file   Stress: Not on file   Social Connections: Not on file   Intimate Partner Violence: Not on file   Housing Stability: Not on file      History reviewed. No pertinent family history. History reviewed. No pertinent surgical history.     Current Outpatient Medications:   •  aspirin (Aspirin 81) 81 mg EC tablet, Take 1 tablet (81 mg total) by mouth daily, Disp: 30 tablet, Rfl: 0  No Known Allergies  Vitals:    08/03/23 1406   BP: 118/64   BP Location: Left arm   Patient Position: Sitting   Cuff Size: Standard   Pulse: 64   Height: 6' 2" (1.88 m)       Labs:  Lab Results   Component Value Date    K 4.0 06/06/2023     06/06/2023    CO2 28 06/06/2023    BUN 13 06/06/2023    CREATININE 0.86 06/06/2023    CALCIUM 9.3 06/06/2023     Lab Results   Component Value Date    WBC 5.92 06/06/2023    HGB 12.1 06/06/2023    HCT 36.1 (L) 06/06/2023    MCV 88 06/06/2023     (L) 06/06/2023     Lab Results   Component Value Date    TRIG 59 06/05/2023    HDL 70 06/05/2023     Imaging: ECG obtained at his PCPs office showed sinus rhythm with an incomplete right bundle branch block. ECHO (5/23/2023): •  Left Ventricle: Left ventricular cavity size is normal. Wall thickness is normal. The left ventricular ejection fraction is 66% by biplane measurement. Systolic function is normal. Global longitudinal strain is normal at -22%. Wall motion is normal. Diastolic function is normal.  •  Right Ventricle: Right ventricular cavity size is normal. Systolic function is normal.  •  Left Atrium: The atrium is normal in size. •  Right Atrium: The atrium is normal in size. •  No hemodynamically significant valvular abnormalities noted.     HOLTER (6/5/2023): IMPRESSION:     1. The patient was in sinus rhythm throughout the duration of the study. 2. The average heart rate was 60 bpm.   3. Rare ectopy without any arrhythmias. 4. Symptoms did not correspond to any abnormal findings. 5. Normal Holter study. Review of Systems:  Review of Systems   Constitutional: Negative. HENT: Negative. Eyes: Negative. Respiratory: Negative. Cardiovascular: Positive for chest pain. Gastrointestinal: Negative. Musculoskeletal: Negative. Skin: Negative. Allergic/Immunologic: Negative. Neurological: Positive for light-headedness. Hematological: Negative. Psychiatric/Behavioral: Negative.     All other systems reviewed and are negative. Vitals:    08/03/23 1406   BP: 118/64   BP Location: Left arm   Patient Position: Sitting   Cuff Size: Standard   Pulse: 64   Height: 6' 2" (1.88 m)       Physical Exam  Vitals and nursing note reviewed. Constitutional:       Appearance: He is well-developed. HENT:      Head: Normocephalic and atraumatic. Eyes:      General: No scleral icterus. Right eye: No discharge. Left eye: No discharge. Pupils: Pupils are equal, round, and reactive to light. Neck:      Thyroid: No thyromegaly. Vascular: No JVD. Cardiovascular:      Rate and Rhythm: Normal rate and regular rhythm. No extrasystoles are present. Pulses: Normal pulses. No decreased pulses. Heart sounds: S1 normal and S2 normal. Murmur heard. Systolic murmur is present with a grade of 1/6. No friction rub. No gallop. Pulmonary:      Effort: Pulmonary effort is normal. No respiratory distress. Breath sounds: Normal breath sounds. No wheezing, rhonchi or rales. Abdominal:      General: Bowel sounds are normal. There is no distension. Palpations: Abdomen is soft. Tenderness: There is no abdominal tenderness. Musculoskeletal:         General: No tenderness or deformity. Normal range of motion. Cervical back: Normal range of motion and neck supple. Right lower leg: No edema. Left lower leg: No edema. Skin:     General: Skin is warm and dry. Findings: No rash. Neurological:      Mental Status: He is alert and oriented to person, place, and time. Cranial Nerves: No cranial nerve deficit. Psychiatric:         Thought Content: Thought content normal.         Judgment: Judgment normal.       Counseling / Coordination of Care  Total office time spent today 35 minutes. Greater than 50% of total time was spent with the patient and / or family counseling and / or coordination of care.

## 2023-09-14 ENCOUNTER — TELEPHONE (OUTPATIENT)
Dept: GASTROENTEROLOGY | Facility: CLINIC | Age: 43
End: 2023-09-14

## 2023-09-14 ENCOUNTER — OFFICE VISIT (OUTPATIENT)
Dept: GASTROENTEROLOGY | Facility: CLINIC | Age: 43
End: 2023-09-14
Payer: COMMERCIAL

## 2023-09-14 VITALS
HEIGHT: 74 IN | WEIGHT: 196 LBS | DIASTOLIC BLOOD PRESSURE: 72 MMHG | SYSTOLIC BLOOD PRESSURE: 124 MMHG | BODY MASS INDEX: 25.15 KG/M2

## 2023-09-14 DIAGNOSIS — Z80.0 FAMILY HX OF COLON CANCER: Primary | ICD-10-CM

## 2023-09-14 DIAGNOSIS — R17 SERUM TOTAL BILIRUBIN ELEVATED: ICD-10-CM

## 2023-09-14 DIAGNOSIS — B37.9 CANDIDA INFECTION: ICD-10-CM

## 2023-09-14 DIAGNOSIS — R10.84 GENERALIZED ABDOMINAL PAIN: ICD-10-CM

## 2023-09-14 PROCEDURE — 99243 OFF/OP CNSLTJ NEW/EST LOW 30: CPT | Performed by: INTERNAL MEDICINE

## 2023-09-14 NOTE — PROGRESS NOTES
[facility] - Outpatient Consultation  Yelena Ny 36 y.o. male MRN: [id number]  Encounter: [id number]    ASSESSMENT AND PLAN:      1. Family hx of colon cancer  Patient with a family history of father having colon cancer. In his 76s. Recommend colonoscopy at 35. Patient is overdue for colonoscopy and colorectal cancer screening. We will plan for colonoscopy. Patient states that he wants to practice fasting prior to scheduling the colonoscopy. Strongly recommended this to be done soon. Patient understands risk of undiagnosed colon cancer if he continues to defer colonoscopy. - Ambulatory Referral to Gastroenterology  - Colonoscopy; Future    2. Generalized abdominal pain  Noted. Once per quarter. Vague symptoms and unable to elaborate. Tough to tell exactly what this abdominal discomfort is from. Will evaluate colonoscopy to see if there is any correlation of inflammatory bowel disease. Possible Candida infection. We will check antibody levels to see if there was any exposure in the past.  - Ambulatory Referral to Gastroenterology  - Ambulatory Referral to Gastroenterology    3. Serum total bilirubin elevated  Now resolved  - Ambulatory Referral to Gastroenterology    4. Candida infection  As above  - Candida antibody; Future      Follow up Appointment: For colonoscopy        Chief Complaint   Patient presents with   • Dizziness     Referred by pcp   • family history of colon cancer in father       HPI:   Patient is a 48-year-old male who presents for consultation from PCP regarding generalized abdominal pain, family history of colon cancer, elevated total bilirubin. Patient had lab work on 6/5/2023 with T. bili of 1.31. Normalized to 0.93 on 6/6/2023. Patient ultrasound on 6/1/2023 with with normal liver, biliary system. Unremarkable    No prior colonoscopy. He does state that he has generalized abdominal pain.   Cannot localize or describe the discomfort that he gets. Happens about once per quarter. States that he may have had exposure to Candida in the past.  Says he diagnosed himself. Patient stated no reported fevers, chills, nausea, vomiting, dysphagia, heartburn, SOB, CP, abdominal pain, changes in bowel movement with diarrhea or constipation, GI bleeding, or unintentional weight loss. GI History:  Blood thinners: Denies ASA, antiplatelet, or anticoagulation  NSAID use: Denies  Insulin use: None    Abdominal Surgical Hx: None  Family Hx: Dad with colon cancer dx around 76s  GI procedure Hx: No hx of EGD or colonoscopies        Historical Information   History reviewed. No pertinent past medical history. History reviewed. No pertinent surgical history. Social History     Substance and Sexual Activity   Alcohol Use Not Currently     Social History     Substance and Sexual Activity   Drug Use Not on file     Social History     Tobacco Use   Smoking Status Never   Smokeless Tobacco Never     Family History   Problem Relation Age of Onset   • No Known Problems Mother    • Colon cancer Father    • No Known Problems Sister        Meds/Allergies     Current Outpatient Medications:   •  aspirin (Aspirin 81) 81 mg EC tablet    No Known Allergies    PHYSICAL EXAM:    Blood pressure 124/72, height 6' 2" (1.88 m), weight 88.9 kg (196 lb). Body mass index is 25.16 kg/m². General Appearance: NAD, cooperative, flat affect  Eyes: Anicteric  GI:  Soft, non-tender, non-distended; normal bowel sounds; no masses, no organomegaly   Rectal: Deferred  Musculoskeletal: No edema.   Skin:  No jaundice    Lab Results:   Lab Results   Component Value Date    WBC 5.92 06/06/2023    WBC 4.55 06/05/2023    HGB 12.1 06/06/2023    HGB 12.6 06/05/2023    MCV 88 06/06/2023     (L) 06/06/2023     06/05/2023    INR 1.02 06/06/2023     Lab Results   Component Value Date    K 4.0 06/06/2023     06/06/2023    CO2 28 06/06/2023    BUN 13 06/06/2023    CREATININE 0.86 06/06/2023    GLUF 100 (H) 06/05/2023    CALCIUM 9.3 06/06/2023    AST 11 (L) 06/06/2023    AST 12 (L) 06/05/2023    ALT 13 06/06/2023    ALT 14 06/05/2023    ALKPHOS 26 (L) 06/06/2023    ALKPHOS 27 (L) 06/05/2023    EGFR 106 06/06/2023     No results found for: "IRON", "TIBC", "FERRITIN"  Lab Results   Component Value Date    LIPASE 16 06/05/2023       Radiology Results:   No results found.

## 2023-09-14 NOTE — TELEPHONE ENCOUNTER
Scheduled date of colonoscopy (as of today): TBD  Physician performing colonoscopy: Augustin Ellis/MILKA  Location of colonoscopy: [facility]   Bowel prep reviewed with patient: Miralax/Dulcolax  Instructions reviewed with patient by: Gave pt instructions packet  Clearances: n/a

## 2023-09-21 ENCOUNTER — TELEPHONE (OUTPATIENT)
Dept: GASTROENTEROLOGY | Facility: CLINIC | Age: 43
End: 2023-09-21

## 2023-09-21 NOTE — TELEPHONE ENCOUNTER
1st call-spoke w/pt to sched colon at [facility] endo-ordered from ov with Dr Harpreet Newsome

## 2023-09-27 ENCOUNTER — APPOINTMENT (OUTPATIENT)
Dept: LAB | Facility: HOSPITAL | Age: 43
End: 2023-09-27
Payer: COMMERCIAL

## 2023-09-27 DIAGNOSIS — B37.9 CANDIDA INFECTION: ICD-10-CM

## 2023-09-27 PROCEDURE — 36415 COLL VENOUS BLD VENIPUNCTURE: CPT

## 2023-09-27 PROCEDURE — 86628 CANDIDA ANTIBODY: CPT

## 2023-09-29 LAB
CANDIDA AB IGA: NEGATIVE
CANDIDA AB IGG: NEGATIVE
CANDIDA AB IGM: NEGATIVE

## 2023-10-16 ENCOUNTER — TELEPHONE (OUTPATIENT)
Dept: GASTROENTEROLOGY | Facility: CLINIC | Age: 43
End: 2023-10-16

## 2024-10-10 ENCOUNTER — OFFICE VISIT (OUTPATIENT)
Dept: INTERNAL MEDICINE CLINIC | Facility: CLINIC | Age: 44
End: 2024-10-10
Payer: COMMERCIAL

## 2024-10-10 VITALS
BODY MASS INDEX: 26.31 KG/M2 | RESPIRATION RATE: 12 BRPM | SYSTOLIC BLOOD PRESSURE: 120 MMHG | HEART RATE: 78 BPM | DIASTOLIC BLOOD PRESSURE: 70 MMHG | HEIGHT: 74 IN | OXYGEN SATURATION: 99 % | WEIGHT: 205 LBS

## 2024-10-10 DIAGNOSIS — R07.89 OTHER CHEST PAIN: ICD-10-CM

## 2024-10-10 DIAGNOSIS — R17 SERUM TOTAL BILIRUBIN ELEVATED: ICD-10-CM

## 2024-10-10 DIAGNOSIS — Z13.6 SCREENING FOR CARDIOVASCULAR CONDITION: ICD-10-CM

## 2024-10-10 DIAGNOSIS — Z00.00 ANNUAL PHYSICAL EXAM: Primary | ICD-10-CM

## 2024-10-10 DIAGNOSIS — R53.83 OTHER FATIGUE: ICD-10-CM

## 2024-10-10 DIAGNOSIS — E78.00 HYPERCHOLESTEROLEMIA: ICD-10-CM

## 2024-10-10 DIAGNOSIS — Z80.0 FAMILY HX OF COLON CANCER: ICD-10-CM

## 2024-10-10 DIAGNOSIS — Z12.11 SCREENING FOR COLORECTAL CANCER: ICD-10-CM

## 2024-10-10 DIAGNOSIS — Z13.1 SCREENING FOR DIABETES MELLITUS: ICD-10-CM

## 2024-10-10 DIAGNOSIS — Z12.12 SCREENING FOR COLORECTAL CANCER: ICD-10-CM

## 2024-10-10 DIAGNOSIS — R73.9 HYPERGLYCEMIA: ICD-10-CM

## 2024-10-10 DIAGNOSIS — Q67.6 CONGENITAL PECTUS EXCAVATUM: ICD-10-CM

## 2024-10-10 DIAGNOSIS — I45.10 INCOMPLETE RBBB: ICD-10-CM

## 2024-10-10 DIAGNOSIS — L30.8 PSORIASIFORM ECZEMA: ICD-10-CM

## 2024-10-10 PROCEDURE — 99396 PREV VISIT EST AGE 40-64: CPT | Performed by: INTERNAL MEDICINE

## 2024-10-10 RX ORDER — FLUOCINOLONE ACETONIDE 0.1 MG/ML
SOLUTION TOPICAL 2 TIMES DAILY
Qty: 60 ML | Refills: 2 | Status: SHIPPED | OUTPATIENT
Start: 2024-10-10

## 2024-10-10 NOTE — PATIENT INSTRUCTIONS
"Patient Education     Routine physical for adults   The Basics   Written by the doctors and editors at [facility]   What is a physical? -- A physical is a routine visit, or \"check-up,\" with your doctor. You might also hear it called a \"wellness visit\" or \"preventive visit.\"  During each visit, the doctor will:   Ask about your physical and mental health   Ask about your habits, behaviors, and lifestyle   Do an exam   Give you vaccines if needed   Talk to you about any medicines you take   Give advice about your health   Answer your questions  Getting regular check-ups is an important part of taking care of your health. It can help your doctor find and treat any problems you have. But it's also important for preventing health problems.  A routine physical is different from a \"sick visit.\" A sick visit is when you see a doctor because of a health concern or problem. Since physicals are scheduled ahead of time, you can think about what you want to ask the doctor.  How often should I get a physical? -- It depends on your age and health. In general, for people age 21 years and older:   If you are younger than 50 years, you might be able to get a physical every 3 years.   If you are 50 years or older, your doctor might recommend a physical every year.  If you have an ongoing health condition, like diabetes or high blood pressure, your doctor will probably want to see you more often.  What happens during a physical? -- In general, each visit will include:   Physical exam - The doctor or nurse will check your height, weight, heart rate, and blood pressure. They will also look at your eyes and ears. They will ask about how you are feeling and whether you have any symptoms that bother you.   Medicines - It's a good idea to bring a list of all the medicines you take to each doctor visit. Your doctor will talk to you about your medicines and answer any questions. Tell them if you are having any side effects that bother you. You " "should also tell them if you are having trouble paying for any of your medicines.   Habits and behaviors - This includes:   Your diet   Your exercise habits   Whether you smoke, drink alcohol, or use drugs   Whether you are sexually active   Whether you feel safe at home  Your doctor will talk to you about things you can do to improve your health and lower your risk of health problems. They will also offer help and support. For example, if you want to quit smoking, they can give you advice and might prescribe medicines. If you want to improve your diet or get more physical activity, they can help you with this, too.   Lab tests, if needed - The tests you get will depend on your age and situation. For example, your doctor might want to check your:   Cholesterol   Blood sugar   Iron level   Vaccines - The recommended vaccines will depend on your age, health, and what vaccines you already had. Vaccines are very important because they can prevent certain serious or deadly infections.   Discussion of screening - \"Screening\" means checking for diseases or other health problems before they cause symptoms. Your doctor can recommend screening based on your age, risk, and preferences. This might include tests to check for:   Cancer, such as breast, prostate, cervical, ovarian, colorectal, prostate, lung, or skin cancer   Sexually transmitted infections, such as chlamydia and gonorrhea   Mental health conditions like depression and anxiety  Your doctor will talk to you about the different types of screening tests. They can help you decide which screenings to have. They can also explain what the results might mean.   Answering questions - The physical is a good time to ask the doctor or nurse questions about your health. If needed, they can refer you to other doctors or specialists, too.  Adults older than 65 years often need other care, too. As you get older, your doctor will talk to you about:   How to prevent falling at " home   Hearing or vision tests   Memory testing   How to take your medicines safely   Making sure that you have the help and support you need at home  All topics are updated as new evidence becomes available and our peer review process is complete.  This topic retrieved from [employer] on: May 02, 2024.  Topic 567503 Version 1.0  Release: 32.4.3 - C32.122  © 2024 UpToDate, Inc. and/or its affiliates. All rights reserved.  Consumer Information Use and Disclaimer   Disclaimer: This generalized information is a limited summary of diagnosis, treatment, and/or medication information. It is not meant to be comprehensive and should be used as a tool to help the user understand and/or assess potential diagnostic and treatment options. It does NOT include all information about conditions, treatments, medications, side effects, or risks that may apply to a specific patient. It is not intended to be medical advice or a substitute for the medical advice, diagnosis, or treatment of a health care provider based on the health care provider's examination and assessment of a patient's specific and unique circumstances. Patients must speak with a health care provider for complete information about their health, medical questions, and treatment options, including any risks or benefits regarding use of medications. This information does not endorse any treatments or medications as safe, effective, or approved for treating a specific patient. UpToDate, Inc. and its affiliates disclaim any warranty or liability relating to this information or the use thereof.The use of this information is governed by the Terms of Use, available at https://www.[employer].com/en/know/clinical-effectiveness-terms. 2024© UpToDate, Inc. and its affiliates and/or licensors. All rights reserved.  Copyright   © 2024 UpToDate, Inc. and/or its affiliates. All rights reserved.

## 2024-10-10 NOTE — PROGRESS NOTES
Refused colonoscopy can't fast  Fuzzy feeling gone with fasting  Rare etoh  No tob    Dent ribs pectus    Cp turn right worse      Adult Annual Physical  Name: Rachana Bird      : 1980      MRN: [id number]  Encounter Provider: Michael Purvis DO  Encounter Date: 10/10/2024   Encounter department: [facility] INTERNAL MEDICINE    Assessment & Plan  Incomplete RBBB         Family hx of colon cancer         Hypercholesterolemia         Hyperglycemia         Serum total bilirubin elevated         Other fatigue         Annual physical exam    Orders:    CBC and Platelet; Future    CBC and Platelet    Screening for colorectal cancer    Orders:    Cologuard    Screening for diabetes mellitus    Orders:    Hemoglobin A1C; Future    Screening for cardiovascular condition    Orders:    Comprehensive metabolic panel; Future    Lipid panel; Future    Comprehensive metabolic panel    Lipid panel    Congenital pectus excavatum    Orders:    XR sternum minimum 2 views; Future    Other chest pain    Orders:    Stress test only, exercise; Future    XR sternum minimum 2 views; Future    Psoriasiform eczema    Orders:    fluocinolone (SYNALAR) 0.01 % external solution; Apply topically 2 (two) times a day      Immunizations and preventive care screenings were discussed with patient today. Appropriate education was printed on patient's after visit summary.        Counseling:  Exercise: the importance of regular exercise/physical activity was discussed. Recommend exercise 3-5 times per week for at least 30 minutes.       Depression Screening and Follow-up Plan: Patient was screened for depression during today's encounter. They screened negative with a PHQ-2 score of 0.        History of Present Illness     Adult Annual Physical:  Patient presents for annual physical.     Diet and Physical Activity:  - Diet/Nutrition: well balanced diet.  - Exercise: walking.    Depression Screening:  - PHQ-2 Score: 0    General  "Health:  - Sleep: sleeps well.  - Hearing: normal hearing right ear.  - Vision: no vision problems.  - Dental: regular dental visits.     Health:  - History of STDs: no.   - Urinary symptoms: none.     Review of Systems   Constitutional:  Negative for activity change, appetite change, chills, diaphoresis, fatigue and fever.   HENT:  Negative for congestion, facial swelling, hearing loss, mouth sores, rhinorrhea, sore throat, trouble swallowing and voice change.    Eyes:  Negative for photophobia and pain.   Respiratory:  Negative for apnea, cough, chest tightness, shortness of breath and stridor.    Cardiovascular:  Negative for chest pain, palpitations and leg swelling.   Gastrointestinal:  Negative for abdominal distention, abdominal pain, blood in stool and constipation.   Endocrine: Negative for cold intolerance and heat intolerance.   Genitourinary:  Negative for difficulty urinating, dysuria, flank pain, genital sores, hematuria and urgency.   Musculoskeletal:  Negative for arthralgias, back pain, gait problem, joint swelling and myalgias.   Skin:  Negative for rash and wound.   Allergic/Immunologic: Negative for environmental allergies, food allergies and immunocompromised state.   Neurological:  Negative for dizziness, tremors, seizures, syncope, facial asymmetry, speech difficulty, weakness, light-headedness, numbness and headaches.   Hematological:  Negative for adenopathy. Does not bruise/bleed easily.   Psychiatric/Behavioral:  Negative for agitation, behavioral problems, hallucinations, self-injury, sleep disturbance and suicidal ideas.      Social History     Tobacco Use    Smoking status: Never     Passive exposure: Never    Smokeless tobacco: Never   Vaping Use    Vaping status: Never Used   Substance and Sexual Activity    Alcohol use: Not Currently    Drug use: Not on file    Sexual activity: Not on file       Objective     /70   Pulse 78   Resp 12   Ht 6' 2\" (1.88 m)   Wt 93 kg (205 " lb)   SpO2 99%   BMI 26.32 kg/m²     Physical Exam  Constitutional:       General: He is not in acute distress.     Appearance: Normal appearance. He is not ill-appearing or toxic-appearing.   HENT:      Head: Normocephalic and atraumatic.      Right Ear: Tympanic membrane and external ear normal.      Left Ear: Tympanic membrane and external ear normal.      Nose: Nose normal.      Mouth/Throat:      Mouth: Mucous membranes are moist.      Pharynx: Oropharynx is clear.   Eyes:      General: No scleral icterus.        Right eye: No discharge.         Left eye: No discharge.      Extraocular Movements: Extraocular movements intact.      Conjunctiva/sclera: Conjunctivae normal.      Pupils: Pupils are equal, round, and reactive to light.   Neck:      Vascular: No carotid bruit.   Cardiovascular:      Rate and Rhythm: Normal rate and regular rhythm.      Pulses: Normal pulses.      Heart sounds: No murmur heard.     No friction rub. No gallop.   Pulmonary:      Effort: Pulmonary effort is normal.      Breath sounds: Normal breath sounds. No wheezing, rhonchi or rales.   Abdominal:      General: Bowel sounds are normal. There is no distension.      Palpations: Abdomen is soft. There is no mass.      Tenderness: There is no guarding or rebound.   Musculoskeletal:         General: No swelling.      Cervical back: Normal range of motion and neck supple. No rigidity.      Right lower leg: No edema.      Left lower leg: No edema.   Lymphadenopathy:      Cervical: No cervical adenopathy.   Skin:     General: Skin is warm.      Capillary Refill: Capillary refill takes less than 2 seconds.      Coloration: Skin is not jaundiced.      Findings: No rash.   Neurological:      General: No focal deficit present.      Mental Status: He is alert and oriented to person, place, and time.      Cranial Nerves: No cranial nerve deficit.      Sensory: No sensory deficit.      Motor: No weakness.      Gait: Gait normal.      Deep Tendon  Reflexes: Reflexes normal.   Psychiatric:         Mood and Affect: Mood normal.         Behavior: Behavior normal.         Judgment: Judgment normal.

## 2024-10-10 NOTE — ASSESSMENT & PLAN NOTE
Orders:    fluocinolone (SYNALAR) 0.01 % external solution; Apply topically 2 (two) times a day

## 2024-10-10 NOTE — ASSESSMENT & PLAN NOTE
Orders:    Comprehensive metabolic panel; Future    Lipid panel; Future    Comprehensive metabolic panel    Lipid panel

## 2024-10-11 ENCOUNTER — TELEPHONE (OUTPATIENT)
Age: 44
End: 2024-10-11

## 2024-10-11 NOTE — TELEPHONE ENCOUNTER
PA for Fluocinolone (SYNALAR) 0.01 % external solution SUBMITTED     via    [x]CMM-KEY: [id number]  []Surescripts-Case ID #   []Availity-Auth ID #   []Faxed to plan   []Other website   []Phone call Case ID #     Office notes sent, clinical questions answered. Awaiting determination    Turnaround time for your insurance to make a decision on your Prior Authorization can take 7-21 business days.

## 2024-10-14 NOTE — TELEPHONE ENCOUNTER
PA for Fluocinolone (SYNALAR) 0.01 % external solution DENIED    Reason:(Screenshot if applicable)        Message sent to office clinical pool Yes    Denial letter scanned into Media Yes    Appeal started No (Provider will need to decide if appeal is warranted and send clinical documentation to Prior Authorization Team for initiation.)    **Please follow up with your patient regarding denial and next steps**

## 2024-10-15 ENCOUNTER — HOSPITAL ENCOUNTER (OUTPATIENT)
Dept: RADIOLOGY | Facility: HOSPITAL | Age: 44
Discharge: HOME | End: 2024-10-15
Payer: COMMERCIAL

## 2024-10-15 ENCOUNTER — APPOINTMENT (OUTPATIENT)
Dept: LAB | Facility: HOSPITAL | Age: 44
End: 2024-10-15
Payer: COMMERCIAL

## 2024-10-15 DIAGNOSIS — Z13.1 SCREENING FOR DIABETES MELLITUS: ICD-10-CM

## 2024-10-15 DIAGNOSIS — Z00.00 ROUTINE GENERAL MEDICAL EXAMINATION AT A HEALTH CARE FACILITY: ICD-10-CM

## 2024-10-15 DIAGNOSIS — R07.89 OTHER CHEST PAIN: ICD-10-CM

## 2024-10-15 DIAGNOSIS — Q67.6 CONGENITAL PECTUS EXCAVATUM: ICD-10-CM

## 2024-10-15 LAB
ALBUMIN SERPL BCG-MCNC: 4.5 G/DL (ref 3.5–5)
ALP SERPL-CCNC: 37 U/L (ref 34–104)
ALT SERPL W P-5'-P-CCNC: 25 U/L (ref 7–52)
ANION GAP SERPL CALCULATED.3IONS-SCNC: 7 MMOL/L (ref 4–13)
AST SERPL W P-5'-P-CCNC: 17 U/L (ref 13–39)
BILIRUB SERPL-MCNC: 0.95 MG/DL (ref 0.2–1)
BUN SERPL-MCNC: 21 MG/DL (ref 5–25)
CALCIUM SERPL-MCNC: 9.5 MG/DL (ref 8.4–10.2)
CHLORIDE SERPL-SCNC: 104 MMOL/L (ref 96–108)
CHOLEST SERPL-MCNC: 194 MG/DL
CO2 SERPL-SCNC: 30 MMOL/L (ref 21–32)
CREAT SERPL-MCNC: 0.76 MG/DL (ref 0.6–1.3)
ERYTHROCYTE [DISTWIDTH] IN BLOOD BY AUTOMATED COUNT: 11.7 % (ref 11.6–15.1)
EST. AVERAGE GLUCOSE BLD GHB EST-MCNC: 100 MG/DL
GFR SERPL CREATININE-BSD FRML MDRD: 110 ML/MIN/1.73SQ M
GLUCOSE P FAST SERPL-MCNC: 88 MG/DL (ref 65–99)
HBA1C MFR BLD: 5.1 %
HCT VFR BLD AUTO: 40 % (ref 36.5–49.3)
HDLC SERPL-MCNC: 55 MG/DL
HGB BLD-MCNC: 13.6 G/DL (ref 12–17)
LDLC SERPL CALC-MCNC: 125 MG/DL (ref 0–100)
MCH RBC QN AUTO: 30.3 PG (ref 26.8–34.3)
MCHC RBC AUTO-ENTMCNC: 34 G/DL (ref 31.4–37.4)
MCV RBC AUTO: 89 FL (ref 82–98)
NONHDLC SERPL-MCNC: 139 MG/DL
PLATELET # BLD AUTO: 153 THOUSANDS/UL (ref 149–390)
PMV BLD AUTO: 11.7 FL (ref 8.9–12.7)
POTASSIUM SERPL-SCNC: 4.1 MMOL/L (ref 3.5–5.3)
PROT SERPL-MCNC: 7.5 G/DL (ref 6.4–8.4)
RBC # BLD AUTO: 4.49 MILLION/UL (ref 3.88–5.62)
SODIUM SERPL-SCNC: 141 MMOL/L (ref 135–147)
TRIGL SERPL-MCNC: 69 MG/DL
WBC # BLD AUTO: 4.61 THOUSAND/UL (ref 4.31–10.16)

## 2024-10-15 PROCEDURE — 83036 HEMOGLOBIN GLYCOSYLATED A1C: CPT

## 2024-10-15 PROCEDURE — 71120 X-RAY EXAM BREASTBONE 2/>VWS: CPT

## 2024-10-15 PROCEDURE — 80061 LIPID PANEL: CPT

## 2024-10-15 PROCEDURE — 80053 COMPREHEN METABOLIC PANEL: CPT

## 2024-10-15 PROCEDURE — 36415 COLL VENOUS BLD VENIPUNCTURE: CPT

## 2024-10-15 PROCEDURE — 85027 COMPLETE CBC AUTOMATED: CPT

## 2024-11-09 PROBLEM — Z13.6 SCREENING FOR CARDIOVASCULAR CONDITION: Status: RESOLVED | Noted: 2023-05-11 | Resolved: 2024-11-09

## 2024-11-20 LAB — COLOGUARD RESULT REPORTABLE: NEGATIVE

## 2024-11-26 ENCOUNTER — HOSPITAL ENCOUNTER (OUTPATIENT)
Dept: NON INVASIVE DIAGNOSTICS | Facility: HOSPITAL | Age: 44
Discharge: HOME | End: 2024-11-26
Payer: COMMERCIAL

## 2024-11-26 VITALS
WEIGHT: 205 LBS | HEART RATE: 71 BPM | HEIGHT: 74 IN | DIASTOLIC BLOOD PRESSURE: 88 MMHG | OXYGEN SATURATION: 99 % | BODY MASS INDEX: 26.31 KG/M2 | SYSTOLIC BLOOD PRESSURE: 138 MMHG

## 2024-11-26 DIAGNOSIS — R07.89 OTHER CHEST PAIN: ICD-10-CM

## 2024-11-26 LAB
CHEST PAIN STATEMENT: NORMAL
MAX DIASTOLIC BP: 68 MMHG
MAX HR PERCENT: 87 %
MAX HR: 153 BPM
MAX PREDICTED HEART RATE: 175 BPM
PROTOCOL NAME: NORMAL
RATE PRESSURE PRODUCT: NORMAL
SL CV STRESS RECOVERY BP: NORMAL MMHG
SL CV STRESS RECOVERY HR: 88 BPM
SL CV STRESS RECOVERY O2 SAT: 99 %
SL CV STRESS STAGE REACHED: 3
STRESS ANGINA INDEX: 0
STRESS BASELINE BP: NORMAL MMHG
STRESS BASELINE HR: 71 BPM
STRESS O2 SAT REST: 99 %
STRESS PEAK HR: 153 BPM
STRESS POST ESTIMATED WORKLOAD: 9.3 METS
STRESS POST EXERCISE DUR MIN: 7 MIN
STRESS POST EXERCISE DUR MIN: 7 MIN
STRESS POST EXERCISE DUR SEC: 30 SEC
STRESS POST EXERCISE DUR SEC: 30 SEC
STRESS POST O2 SAT PEAK: 99 %
STRESS POST PEAK BP: 174 MMHG
STRESS POST PEAK HR: 153 BPM
STRESS POST PEAK SYSTOLIC BP: 174 MMHG
TARGET HR FORMULA: NORMAL
TEST INDICATION: NORMAL

## 2024-11-26 PROCEDURE — 93017 CV STRESS TEST TRACING ONLY: CPT

## 2024-11-26 PROCEDURE — 93018 CV STRESS TEST I&R ONLY: CPT

## 2024-11-26 PROCEDURE — 93016 CV STRESS TEST SUPVJ ONLY: CPT

## 2024-12-02 NOTE — PROGRESS NOTES
Assessment/Plan:    No problem-specific Assessment & Plan notes found for this encounter.       There are no diagnoses linked to this encounter.      Subjective:      Patient ID: Rachana Bird is a 44 y.o. male.    Labs 10/2024=lipids improved---diet  changest    Est=no ischemia-7.5 min    Cologuard neg    Sternal xray=djd sterno manubrium junction=reviewed personally      Cut  eggs on  own  tea tree oil        The following portions of the patient's history were reviewed and updated as appropriate: allergies, current medications, past family history, past medical history, past social history, past surgical history, and problem list.    Review of Systems   Constitutional:  Negative for activity change and fatigue.   HENT:  Negative for ear discharge, ear pain, rhinorrhea and sore throat.    Eyes:  Negative for pain and visual disturbance.   Respiratory:  Negative for cough and shortness of breath.    Cardiovascular:  Negative for chest pain and leg swelling.   Gastrointestinal:  Negative for abdominal pain, constipation and diarrhea.   Endocrine: Negative for cold intolerance and polyuria.   Genitourinary:  Negative for flank pain and hematuria.   Musculoskeletal:  Negative for back pain and joint swelling.   Skin:  Negative for pallor and wound.   Neurological:  Negative for dizziness, seizures and speech difficulty.   Psychiatric/Behavioral:  Negative for confusion and hallucinations.          Objective:      There were no vitals taken for this visit.         Physical Exam  Vitals and nursing note reviewed.   Constitutional:       General: He is not in acute distress.     Appearance: Normal appearance. He is not ill-appearing.   HENT:      Head: Normocephalic.      Right Ear: External ear normal. There is no impacted cerumen.      Left Ear: External ear normal. There is no impacted cerumen.      Nose: No congestion or rhinorrhea.      Mouth/Throat:      Pharynx: No posterior oropharyngeal erythema.   Eyes:       General: No scleral icterus.        Right eye: No discharge.         Left eye: No discharge.   Neck:      Vascular: No carotid bruit.   Cardiovascular:      Rate and Rhythm: Normal rate and regular rhythm.      Heart sounds: Normal heart sounds. No murmur heard.     No friction rub. No gallop.   Pulmonary:      Breath sounds: No wheezing or rhonchi.   Abdominal:      General: There is no distension.      Tenderness: There is no abdominal tenderness. There is no guarding.   Musculoskeletal:         General: No swelling.      Cervical back: No rigidity.      Right lower leg: No edema.      Left lower leg: No edema.   Lymphadenopathy:      Cervical: No cervical adenopathy.   Skin:     Coloration: Skin is not jaundiced.   Neurological:      Mental Status: He is alert.      Cranial Nerves: No cranial nerve deficit.      Motor: No weakness.      Coordination: Coordination normal.   Psychiatric:         Mood and Affect: Mood normal.

## 2024-12-03 ENCOUNTER — OFFICE VISIT (OUTPATIENT)
Dept: INTERNAL MEDICINE CLINIC | Facility: CLINIC | Age: 44
End: 2024-12-03
Payer: COMMERCIAL

## 2024-12-03 VITALS
SYSTOLIC BLOOD PRESSURE: 130 MMHG | BODY MASS INDEX: 26.56 KG/M2 | WEIGHT: 207 LBS | HEART RATE: 78 BPM | HEIGHT: 74 IN | DIASTOLIC BLOOD PRESSURE: 70 MMHG

## 2024-12-03 DIAGNOSIS — E78.00 HYPERCHOLESTEROLEMIA: Primary | ICD-10-CM

## 2024-12-03 DIAGNOSIS — L21.0 DANDRUFF: ICD-10-CM

## 2024-12-03 DIAGNOSIS — Q68.8 CLAVICLE DEFORMITY, CONGENITAL: ICD-10-CM

## 2024-12-03 PROBLEM — Q67.6 CONGENITAL PECTUS EXCAVATUM: Status: RESOLVED | Noted: 2024-10-10 | Resolved: 2024-12-03

## 2024-12-03 PROCEDURE — 99213 OFFICE O/P EST LOW 20 MIN: CPT | Performed by: INTERNAL MEDICINE
